# Patient Record
Sex: FEMALE | Race: WHITE | NOT HISPANIC OR LATINO | URBAN - METROPOLITAN AREA
[De-identification: names, ages, dates, MRNs, and addresses within clinical notes are randomized per-mention and may not be internally consistent; named-entity substitution may affect disease eponyms.]

---

## 2019-11-05 ENCOUNTER — INPATIENT (INPATIENT)
Facility: HOSPITAL | Age: 29
LOS: 0 days | Discharge: HOME | DRG: 638 | End: 2019-11-06
Attending: STUDENT IN AN ORGANIZED HEALTH CARE EDUCATION/TRAINING PROGRAM | Admitting: STUDENT IN AN ORGANIZED HEALTH CARE EDUCATION/TRAINING PROGRAM
Payer: MEDICAID

## 2019-11-05 VITALS
HEIGHT: 68 IN | RESPIRATION RATE: 18 BRPM | SYSTOLIC BLOOD PRESSURE: 118 MMHG | DIASTOLIC BLOOD PRESSURE: 75 MMHG | WEIGHT: 160.06 LBS | OXYGEN SATURATION: 98 % | HEART RATE: 103 BPM | TEMPERATURE: 98 F

## 2019-11-05 DIAGNOSIS — E87.2 ACIDOSIS: ICD-10-CM

## 2019-11-05 DIAGNOSIS — R05 COUGH: ICD-10-CM

## 2019-11-05 DIAGNOSIS — Z91.89 OTHER SPECIFIED PERSONAL RISK FACTORS, NOT ELSEWHERE CLASSIFIED: ICD-10-CM

## 2019-11-05 DIAGNOSIS — R63.8 OTHER SYMPTOMS AND SIGNS CONCERNING FOOD AND FLUID INTAKE: ICD-10-CM

## 2019-11-05 DIAGNOSIS — E10.9 TYPE 1 DIABETES MELLITUS WITHOUT COMPLICATIONS: ICD-10-CM

## 2019-11-05 DIAGNOSIS — R73.9 HYPERGLYCEMIA, UNSPECIFIED: ICD-10-CM

## 2019-11-05 LAB
ALBUMIN SERPL ELPH-MCNC: 4.8 G/DL — SIGNIFICANT CHANGE UP (ref 3.3–5)
ALP SERPL-CCNC: 92 U/L — SIGNIFICANT CHANGE UP (ref 40–120)
ALT FLD-CCNC: 15 U/L — SIGNIFICANT CHANGE UP (ref 10–45)
ANION GAP SERPL CALC-SCNC: 15 MMOL/L — SIGNIFICANT CHANGE UP (ref 5–17)
APPEARANCE UR: CLEAR — SIGNIFICANT CHANGE UP
AST SERPL-CCNC: 19 U/L — SIGNIFICANT CHANGE UP (ref 10–40)
B-OH-BUTYR SERPL-SCNC: 0.6 MMOL/L — HIGH
BASOPHILS # BLD AUTO: 0.03 K/UL — SIGNIFICANT CHANGE UP (ref 0–0.2)
BASOPHILS NFR BLD AUTO: 0.2 % — SIGNIFICANT CHANGE UP (ref 0–2)
BILIRUB SERPL-MCNC: 0.3 MG/DL — SIGNIFICANT CHANGE UP (ref 0.2–1.2)
BILIRUB UR-MCNC: NEGATIVE — SIGNIFICANT CHANGE UP
BUN SERPL-MCNC: 20 MG/DL — SIGNIFICANT CHANGE UP (ref 7–23)
CALCIUM SERPL-MCNC: 9.9 MG/DL — SIGNIFICANT CHANGE UP (ref 8.4–10.5)
CHLORIDE SERPL-SCNC: 103 MMOL/L — SIGNIFICANT CHANGE UP (ref 96–108)
CO2 SERPL-SCNC: 25 MMOL/L — SIGNIFICANT CHANGE UP (ref 22–31)
COLOR SPEC: YELLOW — SIGNIFICANT CHANGE UP
CREAT SERPL-MCNC: 0.68 MG/DL — SIGNIFICANT CHANGE UP (ref 0.5–1.3)
DIFF PNL FLD: NEGATIVE — SIGNIFICANT CHANGE UP
EOSINOPHIL # BLD AUTO: 0.15 K/UL — SIGNIFICANT CHANGE UP (ref 0–0.5)
EOSINOPHIL NFR BLD AUTO: 0.9 % — SIGNIFICANT CHANGE UP (ref 0–6)
GAS PNL BLDV: SIGNIFICANT CHANGE UP
GLUCOSE SERPL-MCNC: 292 MG/DL — HIGH (ref 70–99)
GLUCOSE UR QL: >=1000
HCT VFR BLD CALC: 44.6 % — SIGNIFICANT CHANGE UP (ref 34.5–45)
HGB BLD-MCNC: 14.7 G/DL — SIGNIFICANT CHANGE UP (ref 11.5–15.5)
IMM GRANULOCYTES NFR BLD AUTO: 0.6 % — SIGNIFICANT CHANGE UP (ref 0–1.5)
KETONES UR-MCNC: 40 MG/DL
LACTATE SERPL-SCNC: 2.5 MMOL/L — HIGH (ref 0.5–2)
LACTATE SERPL-SCNC: 3.1 MMOL/L — HIGH (ref 0.5–2)
LEUKOCYTE ESTERASE UR-ACNC: NEGATIVE — SIGNIFICANT CHANGE UP
LYMPHOCYTES # BLD AUTO: 19.1 % — SIGNIFICANT CHANGE UP (ref 13–44)
LYMPHOCYTES # BLD AUTO: 3.04 K/UL — SIGNIFICANT CHANGE UP (ref 1–3.3)
MCHC RBC-ENTMCNC: 31.4 PG — SIGNIFICANT CHANGE UP (ref 27–34)
MCHC RBC-ENTMCNC: 33 GM/DL — SIGNIFICANT CHANGE UP (ref 32–36)
MCV RBC AUTO: 95.3 FL — SIGNIFICANT CHANGE UP (ref 80–100)
MONOCYTES # BLD AUTO: 0.67 K/UL — SIGNIFICANT CHANGE UP (ref 0–0.9)
MONOCYTES NFR BLD AUTO: 4.2 % — SIGNIFICANT CHANGE UP (ref 2–14)
NEUTROPHILS # BLD AUTO: 11.91 K/UL — HIGH (ref 1.8–7.4)
NEUTROPHILS NFR BLD AUTO: 75 % — SIGNIFICANT CHANGE UP (ref 43–77)
NITRITE UR-MCNC: NEGATIVE — SIGNIFICANT CHANGE UP
NRBC # BLD: 0 /100 WBCS — SIGNIFICANT CHANGE UP (ref 0–0)
PH UR: 6 — SIGNIFICANT CHANGE UP (ref 5–8)
PLATELET # BLD AUTO: 277 K/UL — SIGNIFICANT CHANGE UP (ref 150–400)
POTASSIUM SERPL-MCNC: 4.1 MMOL/L — SIGNIFICANT CHANGE UP (ref 3.5–5.3)
POTASSIUM SERPL-SCNC: 4.1 MMOL/L — SIGNIFICANT CHANGE UP (ref 3.5–5.3)
PROT SERPL-MCNC: 7.3 G/DL — SIGNIFICANT CHANGE UP (ref 6–8.3)
PROT UR-MCNC: NEGATIVE MG/DL — SIGNIFICANT CHANGE UP
RBC # BLD: 4.68 M/UL — SIGNIFICANT CHANGE UP (ref 3.8–5.2)
RBC # FLD: 12.3 % — SIGNIFICANT CHANGE UP (ref 10.3–14.5)
SODIUM SERPL-SCNC: 143 MMOL/L — SIGNIFICANT CHANGE UP (ref 135–145)
SP GR SPEC: 1.02 — SIGNIFICANT CHANGE UP (ref 1–1.03)
UROBILINOGEN FLD QL: 0.2 E.U./DL — SIGNIFICANT CHANGE UP
WBC # BLD: 15.89 K/UL — HIGH (ref 3.8–10.5)
WBC # FLD AUTO: 15.89 K/UL — HIGH (ref 3.8–10.5)

## 2019-11-05 PROCEDURE — 99291 CRITICAL CARE FIRST HOUR: CPT

## 2019-11-05 PROCEDURE — 71046 X-RAY EXAM CHEST 2 VIEWS: CPT | Mod: 26

## 2019-11-05 PROCEDURE — 93010 ELECTROCARDIOGRAM REPORT: CPT

## 2019-11-05 PROCEDURE — 99222 1ST HOSP IP/OBS MODERATE 55: CPT | Mod: GC

## 2019-11-05 RX ORDER — ALBUTEROL 90 UG/1
2.5 AEROSOL, METERED ORAL ONCE
Refills: 0 | Status: COMPLETED | OUTPATIENT
Start: 2019-11-05 | End: 2019-11-05

## 2019-11-05 RX ORDER — INSULIN LISPRO 100/ML
VIAL (ML) SUBCUTANEOUS
Refills: 0 | Status: DISCONTINUED | OUTPATIENT
Start: 2019-11-05 | End: 2019-11-06

## 2019-11-05 RX ORDER — INSULIN LISPRO 100/ML
10 VIAL (ML) SUBCUTANEOUS
Refills: 0 | Status: DISCONTINUED | OUTPATIENT
Start: 2019-11-05 | End: 2019-11-06

## 2019-11-05 RX ORDER — SODIUM CHLORIDE 9 MG/ML
1000 INJECTION INTRAMUSCULAR; INTRAVENOUS; SUBCUTANEOUS ONCE
Refills: 0 | Status: COMPLETED | OUTPATIENT
Start: 2019-11-05 | End: 2019-11-05

## 2019-11-05 RX ORDER — DEXTROSE 50 % IN WATER 50 %
15 SYRINGE (ML) INTRAVENOUS ONCE
Refills: 0 | Status: DISCONTINUED | OUTPATIENT
Start: 2019-11-05 | End: 2019-11-06

## 2019-11-05 RX ORDER — INSULIN GLARGINE 100 [IU]/ML
30 INJECTION, SOLUTION SUBCUTANEOUS AT BEDTIME
Refills: 0 | Status: DISCONTINUED | OUTPATIENT
Start: 2019-11-06 | End: 2019-11-06

## 2019-11-05 RX ORDER — DEXTROSE 50 % IN WATER 50 %
25 SYRINGE (ML) INTRAVENOUS ONCE
Refills: 0 | Status: DISCONTINUED | OUTPATIENT
Start: 2019-11-05 | End: 2019-11-06

## 2019-11-05 RX ORDER — GLUCAGON INJECTION, SOLUTION 0.5 MG/.1ML
1 INJECTION, SOLUTION SUBCUTANEOUS ONCE
Refills: 0 | Status: DISCONTINUED | OUTPATIENT
Start: 2019-11-05 | End: 2019-11-06

## 2019-11-05 RX ORDER — IPRATROPIUM/ALBUTEROL SULFATE 18-103MCG
3 AEROSOL WITH ADAPTER (GRAM) INHALATION EVERY 6 HOURS
Refills: 0 | Status: DISCONTINUED | OUTPATIENT
Start: 2019-11-05 | End: 2019-11-06

## 2019-11-05 RX ORDER — IPRATROPIUM/ALBUTEROL SULFATE 18-103MCG
3 AEROSOL WITH ADAPTER (GRAM) INHALATION ONCE
Refills: 0 | Status: COMPLETED | OUTPATIENT
Start: 2019-11-05 | End: 2019-11-05

## 2019-11-05 RX ORDER — SODIUM CHLORIDE 9 MG/ML
1000 INJECTION, SOLUTION INTRAVENOUS
Refills: 0 | Status: DISCONTINUED | OUTPATIENT
Start: 2019-11-05 | End: 2019-11-06

## 2019-11-05 RX ORDER — SODIUM CHLORIDE 9 MG/ML
1000 INJECTION INTRAMUSCULAR; INTRAVENOUS; SUBCUTANEOUS
Refills: 0 | Status: DISCONTINUED | OUTPATIENT
Start: 2019-11-05 | End: 2019-11-06

## 2019-11-05 RX ORDER — DEXTROSE 50 % IN WATER 50 %
12.5 SYRINGE (ML) INTRAVENOUS ONCE
Refills: 0 | Status: DISCONTINUED | OUTPATIENT
Start: 2019-11-05 | End: 2019-11-06

## 2019-11-05 RX ADMIN — SODIUM CHLORIDE 100 MILLILITER(S): 9 INJECTION INTRAMUSCULAR; INTRAVENOUS; SUBCUTANEOUS at 23:00

## 2019-11-05 RX ADMIN — SODIUM CHLORIDE 1000 MILLILITER(S): 9 INJECTION INTRAMUSCULAR; INTRAVENOUS; SUBCUTANEOUS at 21:47

## 2019-11-05 RX ADMIN — ALBUTEROL 2.5 MILLIGRAM(S): 90 AEROSOL, METERED ORAL at 21:47

## 2019-11-05 RX ADMIN — SODIUM CHLORIDE 1000 MILLILITER(S): 9 INJECTION INTRAMUSCULAR; INTRAVENOUS; SUBCUTANEOUS at 21:46

## 2019-11-05 RX ADMIN — Medication 3 MILLILITER(S): at 21:47

## 2019-11-05 NOTE — H&P ADULT - PROBLEM SELECTOR PLAN 5
Diet: Diabetic Diet  Replete lytes prn  NS @100cc/hr    VTE ppx: IMPROVE score 0. Low Risk  Disposition: [facility]  Code Status: Full Code

## 2019-11-05 NOTE — H&P ADULT - NSHPPHYSICALEXAM_GEN_ALL_CORE
VITAL SIGNS:  T(C): 36.6 (11-05-19 @ 20:09), Max: 36.6 (11-05-19 @ 20:09)  T(F): 97.9 (11-05-19 @ 20:09), Max: 97.9 (11-05-19 @ 20:09)  HR: 95 (11-05-19 @ 22:01) (95 - 103)  BP: 110/60 (11-05-19 @ 22:01) (110/60 - 118/75)  BP(mean): --  RR: 16 (11-05-19 @ 22:01) (16 - 18)  SpO2: 99% (11-05-19 @ 22:01) (98% - 99%)  Wt(kg): --    PHYSICAL EXAM:    Constitutional: WDWN resting comfortably in bed; NAD  Head: NC/AT  Eyes: PERRL, EOMI, anicteric sclera  ENT: no nasal discharge; uvula midline, no oropharyngeal erythema or exudates; MMM  Neck: supple; no JVD or thyromegaly  Respiratory: CTA B/L; no W/R/R, no retractions  Cardiac: +S1/S2; RRR; no M/R/G; PMI non-displaced  Gastrointestinal: abdomen soft, NT/ND; no rebound or guarding; +BSx4  Back: spine midline, no bony tenderness or step-offs; no CVAT B/L  Extremities: WWP, no clubbing or cyanosis; no peripheral edema  Musculoskeletal: NROM x4; no joint swelling, tenderness or erythema  Vascular: 2+ radial, femoral, DP/PT pulses B/L  Dermatologic: skin warm, dry and intact; no rashes, wounds, or scars  Lymphatic: no submandibular or cervical LAD  Neurologic: AAOx3; CNII-XII grossly intact; no focal deficits  Psychiatric: affect and characteristics of appearance, verbalizations, behaviors are appropriate VITAL SIGNS:  T(C): 36.6 (11-05-19 @ 20:09), Max: 36.6 (11-05-19 @ 20:09)  T(F): 97.9 (11-05-19 @ 20:09), Max: 97.9 (11-05-19 @ 20:09)  HR: 95 (11-05-19 @ 22:01) (95 - 103)  BP: 110/60 (11-05-19 @ 22:01) (110/60 - 118/75)  BP(mean): --  RR: 16 (11-05-19 @ 22:01) (16 - 18)  SpO2: 99% (11-05-19 @ 22:01) (98% - 99%)  Wt(kg): --    PHYSICAL EXAM:    Constitutional: WDWN resting comfortably in bed; NAD  Head: NC/AT  Eyes: PERRL, EOMI, anicteric sclera  ENT: no nasal discharge; uvula midline, no oropharyngeal erythema or exudates; MMM  Neck: supple; no JVD or thyromegaly  Respiratory: CTA B/L; expiratory wheezing anterior and posteriorly, no crackles  Cardiac: +S1/S2; RRR; no M/R/G; PMI non-displaced  Gastrointestinal: abdomen soft, NT/ND; no rebound or guarding; +BSx4  Back: spine midline, no bony tenderness or step-offs; no CVAT B/L  Extremities: WWP, no clubbing or cyanosis; no peripheral edema  Musculoskeletal: NROM x4; no joint swelling, tenderness or erythema  Vascular: 2+ radial, femoral, DP/PT pulses B/L  Dermatologic: skin warm, dry and intact; no rashes, wounds, or scars  Lymphatic: no submandibular or cervical LAD  Neurologic: AAOx3; no focal deficits  Psychiatric: affect and characteristics of appearance, verbalizations, behaviors are appropriate

## 2019-11-05 NOTE — H&P ADULT - PROBLEM SELECTOR PLAN 1
-Presenting to [facility] ER for hyperglycemia with POCT  mg/dl. VBG with no acidosis, BHB 0.6, AG wnl. No mental status changes. No concern for DKA/HHS at this time.   -Etiology of her hyperglycemia in the setting of her non compliance with her insulin due to fear of hypoglycemia which is described in detail in the HPI  -Will continue her home regimen for now: Lantus 30 units at bedtime and Lispro 10 units TID given she endorses when her she takes her medications as prescribed her blood glucose is usually between 100-200 with occasional lows.   -Endocrine consult in the am for her poorly controlled Type 1 DM (HbA1c 11.4 around 1 week ago as per patient). Patient needs to establish follow up as she recently moved from [facility] for coverage  -Monitor FS  -Diabetic Diet -Presenting to [facility] ER for hyperglycemia with POCT  mg/dl. VBG with no acidosis, BHB 0.6, AG wnl. No mental status changes. No concern for DKA/HHS at this time.   -Etiology of her hyperglycemia in the setting of her non compliance with her pre meal insulin due to fear of hypoglycemia which is described in detail in the HPI  -Will continue her home regimen for now: Lantus 30 units at bedtime and Lispro 10 units TID given she endorses when her she takes her medications as prescribed her blood glucose is usually between 100-200 with occasional lows.   -Endocrine consult in the am for her poorly controlled Type 1 DM (HbA1c 11.4 around 1 week ago as per patient). Patient needs to establish follow up as she recently moved from [facility] for coverage  -Monitor FS  -Diabetic Diet -Presenting to [facility] ER for hyperglycemia with POCT  mg/dl. VBG with no acidosis, BHB 0.6, AG wnl. No mental status changes. No concern for DKA/HHS at this time.   -Etiology of her hyperglycemia in the setting of her non compliance with her pre meal insulin due to fear of hypoglycemia which is described in detail in the HPI  -Will continue her home lantus 30 units at bedtime and decrease lispro to 8 unit TID given she describes very frequent episodes of hypoglycemia   -Endocrine consult in the am for her poorly controlled Type 1 DM (HbA1c 11.4 around 1 week ago as per patient). Patient needs to establish follow up as she recently moved from [facility] for coverage  -Monitor FS  -Diabetic Diet

## 2019-11-05 NOTE — H&P ADULT - PROBLEM SELECTOR PLAN 3
-Likely in the setting of intravascular depletion from hyperglycemia. S/p 2LNS bolus.  -Follow up repeat lactate. Patient is hemodynamically stable, perfusing adequately. Her extremities are WWP, mentating AAOX3, urinating

## 2019-11-05 NOTE — ED ADULT NURSE NOTE - NSIMPLEMENTINTERV_GEN_ALL_ED
Implemented All Universal Safety Interventions:  [place] to call system. Call bell, personal items and telephone within reach. Instruct patient to call for assistance. Room bathroom lighting operational. Non-slip footwear when patient is off stretcher. Physically safe environment: no spills, clutter or unnecessary equipment. Stretcher in lowest position, wheels locked, appropriate side rails in place.

## 2019-11-05 NOTE — H&P ADULT - HISTORY OF PRESENT ILLNESS
Patient is a 30 yo F with history of poorly controlled Type 1 DM with neuropathy who presents with the following complaints. She states that today she was feeling very sweaty, fatigued associated with dry mouth, heavy sensation all over her body (described as mud going through her veins) and polyuria. When she checked her FS, it was HIGH (over 600) so she was afraid she was in DKA and decided to come to the ER for evaluation. She took her Lantus 30 units today at 7 pm and took correctional humalog 25 units at 7 pm prior to coming to the ER. Today, she did not use any of her pre meal insulin injections for breakfast and lunch. She also endorses 1-2 day history of intermittent productive cough and wheezing and endorses her uncle was recently staying with her and he had a viral URI. She denies any chest pain, difficulty breathing, nausea, vomiting, abdominal pain, loose bowel movements, dysuria or headache. Denies any fevers, chills sore throat, runny nose. All remaining ROS negative except as mentioned otherwise.   With regards to her diabetes history, she was diagnosed with Type 1 DM 5-6 years ago. She is currently taking Lantus 30 units at bedtime and Lispro 10 units TID. She endorses compliance with her Lantus however states she is non compliant with her pre meal insulin mostly out of fear of hypoglycemia. Today, she had breakfast and lunch however did not take any of her pre meal insulin. She states previously she would be hypoglycemic almost every day as low as 19 and because of that fear she now tends to miss at least 5-6 of her pre meal injections per week. She checks her fasting FS 3-4 times per week and it ranges between  depending on her dinner, usually ranges between 100-200 when she eats dinner. She checks her post prandial 4-5 times per weak and it usually high above 250. With regards to complications, she has neuropathy involving b/l hands and feet. She has had 6 episodes of DKA in the past, most recently in July 2019. Her diet varies including meals such as pasta and pizza and salads sometimes. She was previously following with a PCP in [place] Dr. Johnson who managed her diabetes however since she moved to [place] in October, she does not have a PCP or endocrinologist. Her most recent HbA1c was around 1 week ago and it was 11.4%. Patient is a 30 yo F with history of poorly controlled Type 1 DM with neuropathy who presents with the following complaints. She states that today she was feeling very sweaty, fatigued associated with dry mouth, heavy sensation all over her body (described as mud going through her veins) and polyuria. When she checked her FS, it was HIGH (over 600) so she was afraid she was in DKA and decided to come to the ER for evaluation. She took her Lantus 30 units today at 7 pm and took correctional humalog 25 units at 7 pm prior to coming to the ER. Today, she did not use any of her pre meal insulin injections for breakfast and lunch. She also endorses 1-2 day history of intermittent productive cough and wheezing and endorses her uncle was recently staying with her and he had a viral URI. She denies any chest pain, difficulty breathing, nausea, vomiting, abdominal pain, loose bowel movements, dysuria or headache. Denies any fevers, chills sore throat, runny nose. All remaining ROS negative except as mentioned otherwise.   With regards to her diabetes history, she was diagnosed with Type 1 DM 5-6 years ago. She is currently taking Lantus 30 units at bedtime and Lispro 10 units TID. She endorses compliance with her Lantus however states she is non compliant with her pre meal insulin mostly out of fear of hypoglycemia. Today, she had breakfast and lunch however did not take any of her pre meal insulin. She states previously she would be hypoglycemic almost every day as low as 19 and because of that fear she now tends to miss at least 5-6 of her pre meal injections per week. She checks her fasting FS 3-4 times per week and it ranges between  depending on her dinner, usually ranges between 100-200 when she eats dinner. She checks her post prandial 4-5 times per weak and it usually high above 250. With regards to complications, she has neuropathy involving b/l hands and feet. She has had 6 episodes of DKA in the past, most recently in July 2019. Her diet varies including meals such as pasta and pizza and salads sometimes. She was previously following with a PCP in [place] Dr. Johnson who managed her diabetes however since she moved to [place] in October, she does not have a PCP or endocrinologist. Her most recent HbA1c was around 1 week ago and it was 11.4%.   On arrival to [facility] ED: VS: afebrile, , bp 118/75 and no respiratory distress. Initial labs s/f WBC of 12.59K, lactate 3.1, BMP with no AG, POCT , BHB 0.6, VBG with no acidosis, UA with ketones and >1000 glucose, CXR with no infiltrate. ED course: 2LNS bolus. Patient is being admitted to regional medical floors for further management. Patient is a 28 yo F with history of poorly controlled Type 1 DM with neuropathy who presents with the following complaints. She states that today she was feeling very sweaty, fatigued associated with dry mouth, heavy sensation all over her body (described as mud going through her veins) and polyuria. When she checked her FS, it was HIGH (over 600) so she was afraid she was in DKA and decided to come to the ER for evaluation. She took her Lantus 30 units today at 7 pm and took correctional humalog 25 units at 7 pm prior to coming to the ER. Today, she did not use any of her pre meal insulin injections for breakfast and lunch. She also endorses 1-2 day history of intermittent productive cough and wheezing and endorses her uncle was recently staying with her and he had a viral URI. She denies any chest pain, difficulty breathing, nausea, vomiting, abdominal pain, loose bowel movements, dysuria or headache. Denies any fevers, chills, sore throat, runny nose. All remaining ROS negative except as mentioned otherwise.   With regards to her diabetes history, she was diagnosed with Type 1 DM 5-6 years ago. She is currently taking Lantus 30 units at bedtime and Lispro 10 units TID. She endorses compliance with her Lantus however states she is non compliant with her pre meal insulin mostly out of fear of hypoglycemia. Today, she had breakfast and lunch however did not take any of her pre meal insulin. She states previously she would be hypoglycemic almost every day as low as 19 and because of that fear she now tends to miss at least 5-6 of her pre meal injections per week. She checks her fasting FS 3-4 times per week and it ranges between  depending on her dinner, usually ranges between 100-200 when she eats dinner. She checks her post prandial 4-5 times per weak and it usually high above 250. With regards to complications, she has neuropathy involving b/l hands and feet. She has had 6 episodes of DKA in the past, most recently in July 2019. Her diet varies including meals such as pasta and pizza and salads sometimes. She was previously following with a PCP in [place] Dr. Johnson who managed her diabetes however since she moved to [place] in October, she does not have a PCP or endocrinologist. Her most recent HbA1c was around 1 week ago and it was 11.4%.   On arrival to [facility] ED: VS: afebrile, , bp 118/75 and no respiratory distress. Initial labs s/f WBC of 12.59K, lactate 3.1, BMP with no AG, POCT , BHB 0.6, VBG with no acidosis, UA with ketones and >1000 glucose, CXR with no infiltrate. ED course: 2LNS bolus. Patient is being admitted to regional medical floors for further management.

## 2019-11-05 NOTE — ED PROVIDER NOTE - OBJECTIVE STATEMENT
29 year old female with history of Type I DM (on Lantus, Novalog) presents to ED with concern for elevated blood sugar today.  Patient notes she checked her blood sugar prior to arrival and saw that it was over 600 - at which point she took Novalog and came to ED.  She notes history of DKA, stating she feels as though she may be in DKA at this time.  She admits to recent onset of productive cough with associated wheezing.  Hx of tobacco use noted.  She denies associated fever, chills, chest pain, shortness of breath, abdominal pain, nausea, emesis, changes to bowel movements, urinary symptoms, peripheral edema, rashes or any additional acute complaints or concerns at this time.

## 2019-11-05 NOTE — ED ADULT TRIAGE NOTE - ARRIVAL INFO ADDITIONAL COMMENTS
pt states she was feeling thirsty with a headache and checked her BS and it was over 600.  took 25 units of humalog with her lantus at 7pm.  pt c/o cough and chest congestion.  denies change in insulin dosing.

## 2019-11-05 NOTE — ED PROVIDER NOTE - CLINICAL SUMMARY MEDICAL DECISION MAKING FREE TEXT BOX
Patient in ED w concern for elevated blood sugar today.  Noted to be type I diabetic, with history of DKA x multiple episodes in the past.  Blood sugar noted to be elevated on ED arrival.  IVF bolus x 2 L ordered and labs sent.  Lactate noted to be elevated.  CXR without evidence of PNA.  Will await remaining labs and consult with MICU team secondary to concern for DKA.  Following completion of my shift, patient's care is handed over to oncoming night team pending MICU recommendations.  Anticipate tele admission with close monitoring.  Patient is stable at time of transfer of care. Patient in ED w concern for elevated blood sugar today.  Noted to be type I diabetic, with history of DKA x multiple episodes in the past.  Blood sugar noted to be elevated on ED arrival.  IVF bolus x 2 L ordered and labs sent.  Lactate noted to be elevated.  CXR without evidence of PNA. Patient in ED w concern for elevated blood sugar today.  Noted to be type I diabetic, with history of DKA x multiple episodes in the past.  Blood sugar noted to be elevated on ED arrival.  IVF bolus x 2 L ordered and labs sent.  Lactate noted to be elevated.  CXR without evidence of PNA.  Beta hydroxy slightly elevated, no AG.  + 40 ketones in urine.  Will plan to give IVF, repeat blood sugar and admit to hospitalist team, given no indication for insulin gtt at this time, though concern for high risk for DKA given history.  Plan is discussed with patient who is in agreement.  Will admit at this time.

## 2019-11-05 NOTE — H&P ADULT - NSHPLABSRESULTS_GEN_ALL_CORE
LABS:                         14.7   15.89 )-----------( 277      ( 2019 20:44 )             44.6         143  |  103  |  20  ----------------------------<  292<H>  4.1   |  25  |  0.68    Ca    9.9      2019 20:44  Mg     2.1         TPro  7.3  /  Alb  4.8  /  TBili  0.3  /  DBili  x   /  AST  19  /  ALT  15  /  AlkPhos  92        Urinalysis Basic - ( 2019 20:50 )    Color: Yellow / Appearance: Clear / S.020 / pH: x  Gluc: x / Ketone: 40 mg/dL  / Bili: Negative / Urobili: 0.2 E.U./dL   Blood: x / Protein: NEGATIVE mg/dL / Nitrite: NEGATIVE   Leuk Esterase: NEGATIVE / RBC: x / WBC x   Sq Epi: x / Non Sq Epi: x / Bacteria: x        Lactate, Blood: 3.1 mmoL/L ( @ 21:03)    RADIOLOGY, EKG & ADDITIONAL TESTS: Reviewed.

## 2019-11-05 NOTE — H&P ADULT - ASSESSMENT
28 yo F with history of poorly controlled Type 1 Diabetes Mellitus (6 episodes of DKA, HbA1c 11.4%) who presents with hyperglycemia likely in the setting of non compliance with her pre meal insulin due to fear of hypoglycemia, admitted to regional medical floors for further management.

## 2019-11-05 NOTE — H&P ADULT - PROBLEM SELECTOR PLAN 4
-Presenting with 1 day history of intermittently productive cough associated with wheezing. Physical exam s/f expiratory wheezing anterior and posterior.   -Etiology suspected to be viral in nature given recent sick contact  -Low concern for bacterial PNA currently. Her CXR shows no infiltrates  -Follow up RVP  -Duonebs prn for wheezing -Presenting with 1 day history of intermittently productive cough associated with wheezing. Physical exam s/f expiratory wheezing anterior and posterior lung  -Etiology suspected to be viral in nature given recent sick contact  -Low concern for bacterial PNA currently. Her CXR shows no infiltrates  -Follow up RVP  -Duonebs prn for wheezing -Presenting with 1 day history of intermittently productive cough associated with wheezing. Physical exam s/f expiratory wheezing anterior and posterior lung  -Etiology suspected to be viral in nature given recent sick contact  -Low concern for bacterial PNA currently. Her CXR shows no infiltrates  -RVP +Enterovirus  -Duonebs prn for wheezing

## 2019-11-06 ENCOUNTER — TRANSCRIPTION ENCOUNTER (OUTPATIENT)
Age: 29
End: 2019-11-06

## 2019-11-06 VITALS
RESPIRATION RATE: 18 BRPM | HEART RATE: 86 BPM | SYSTOLIC BLOOD PRESSURE: 118 MMHG | TEMPERATURE: 98 F | OXYGEN SATURATION: 97 % | DIASTOLIC BLOOD PRESSURE: 74 MMHG

## 2019-11-06 LAB
ALBUMIN SERPL ELPH-MCNC: 3.5 G/DL — SIGNIFICANT CHANGE UP (ref 3.3–5)
ALP SERPL-CCNC: 67 U/L — SIGNIFICANT CHANGE UP (ref 40–120)
ALT FLD-CCNC: 11 U/L — SIGNIFICANT CHANGE UP (ref 10–45)
ANION GAP SERPL CALC-SCNC: 10 MMOL/L — SIGNIFICANT CHANGE UP (ref 5–17)
AST SERPL-CCNC: 14 U/L — SIGNIFICANT CHANGE UP (ref 10–40)
BASOPHILS # BLD AUTO: 0.03 K/UL — SIGNIFICANT CHANGE UP (ref 0–0.2)
BASOPHILS NFR BLD AUTO: 0.3 % — SIGNIFICANT CHANGE UP (ref 0–2)
BILIRUB SERPL-MCNC: 0.3 MG/DL — SIGNIFICANT CHANGE UP (ref 0.2–1.2)
BUN SERPL-MCNC: 17 MG/DL — SIGNIFICANT CHANGE UP (ref 7–23)
CALCIUM SERPL-MCNC: 8.1 MG/DL — LOW (ref 8.4–10.5)
CHLORIDE SERPL-SCNC: 112 MMOL/L — HIGH (ref 96–108)
CO2 SERPL-SCNC: 22 MMOL/L — SIGNIFICANT CHANGE UP (ref 22–31)
CREAT SERPL-MCNC: 0.58 MG/DL — SIGNIFICANT CHANGE UP (ref 0.5–1.3)
EOSINOPHIL # BLD AUTO: 0.33 K/UL — SIGNIFICANT CHANGE UP (ref 0–0.5)
EOSINOPHIL NFR BLD AUTO: 3.2 % — SIGNIFICANT CHANGE UP (ref 0–6)
GLUCOSE BLDC GLUCOMTR-MCNC: 136 MG/DL — HIGH (ref 70–99)
GLUCOSE BLDC GLUCOMTR-MCNC: 228 MG/DL — HIGH (ref 70–99)
GLUCOSE SERPL-MCNC: 159 MG/DL — HIGH (ref 70–99)
HBA1C BLD-MCNC: 10.3 % — HIGH (ref 4–5.6)
HCT VFR BLD CALC: 39.6 % — SIGNIFICANT CHANGE UP (ref 34.5–45)
HGB BLD-MCNC: 12.5 G/DL — SIGNIFICANT CHANGE UP (ref 11.5–15.5)
HIV 1+2 AB+HIV1 P24 AG SERPL QL IA: SIGNIFICANT CHANGE UP
IMM GRANULOCYTES NFR BLD AUTO: 0.2 % — SIGNIFICANT CHANGE UP (ref 0–1.5)
LACTATE SERPL-SCNC: 1.5 MMOL/L — SIGNIFICANT CHANGE UP (ref 0.5–2)
LYMPHOCYTES # BLD AUTO: 3.82 K/UL — HIGH (ref 1–3.3)
LYMPHOCYTES # BLD AUTO: 37.5 % — SIGNIFICANT CHANGE UP (ref 13–44)
MAGNESIUM SERPL-MCNC: 1.9 MG/DL — SIGNIFICANT CHANGE UP (ref 1.6–2.6)
MCHC RBC-ENTMCNC: 30.7 PG — SIGNIFICANT CHANGE UP (ref 27–34)
MCHC RBC-ENTMCNC: 31.6 GM/DL — LOW (ref 32–36)
MCV RBC AUTO: 97.3 FL — SIGNIFICANT CHANGE UP (ref 80–100)
MONOCYTES # BLD AUTO: 0.74 K/UL — SIGNIFICANT CHANGE UP (ref 0–0.9)
MONOCYTES NFR BLD AUTO: 7.3 % — SIGNIFICANT CHANGE UP (ref 2–14)
NEUTROPHILS # BLD AUTO: 5.25 K/UL — SIGNIFICANT CHANGE UP (ref 1.8–7.4)
NEUTROPHILS NFR BLD AUTO: 51.5 % — SIGNIFICANT CHANGE UP (ref 43–77)
NRBC # BLD: 0 /100 WBCS — SIGNIFICANT CHANGE UP (ref 0–0)
PLATELET # BLD AUTO: 235 K/UL — SIGNIFICANT CHANGE UP (ref 150–400)
POTASSIUM SERPL-MCNC: 4 MMOL/L — SIGNIFICANT CHANGE UP (ref 3.5–5.3)
POTASSIUM SERPL-SCNC: 4 MMOL/L — SIGNIFICANT CHANGE UP (ref 3.5–5.3)
PROT SERPL-MCNC: 5.5 G/DL — LOW (ref 6–8.3)
RAPID RVP RESULT: DETECTED
RBC # BLD: 4.07 M/UL — SIGNIFICANT CHANGE UP (ref 3.8–5.2)
RBC # FLD: 12.3 % — SIGNIFICANT CHANGE UP (ref 10.3–14.5)
RV+EV RNA SPEC QL NAA+PROBE: DETECTED
SODIUM SERPL-SCNC: 144 MMOL/L — SIGNIFICANT CHANGE UP (ref 135–145)
WBC # BLD: 10.19 K/UL — SIGNIFICANT CHANGE UP (ref 3.8–10.5)
WBC # FLD AUTO: 10.19 K/UL — SIGNIFICANT CHANGE UP (ref 3.8–10.5)

## 2019-11-06 PROCEDURE — 99239 HOSP IP/OBS DSCHRG MGMT >30: CPT | Mod: GC

## 2019-11-06 PROCEDURE — 82962 GLUCOSE BLOOD TEST: CPT

## 2019-11-06 PROCEDURE — 87184 SC STD DISK METHOD PER PLATE: CPT

## 2019-11-06 PROCEDURE — 82330 ASSAY OF CALCIUM: CPT

## 2019-11-06 PROCEDURE — 81003 URINALYSIS AUTO W/O SCOPE: CPT

## 2019-11-06 PROCEDURE — 84295 ASSAY OF SERUM SODIUM: CPT

## 2019-11-06 PROCEDURE — 82010 KETONE BODYS QUAN: CPT

## 2019-11-06 PROCEDURE — 87633 RESP VIRUS 12-25 TARGETS: CPT

## 2019-11-06 PROCEDURE — 93005 ELECTROCARDIOGRAM TRACING: CPT

## 2019-11-06 PROCEDURE — 99222 1ST HOSP IP/OBS MODERATE 55: CPT | Mod: GC

## 2019-11-06 PROCEDURE — 71046 X-RAY EXAM CHEST 2 VIEWS: CPT

## 2019-11-06 PROCEDURE — 99285 EMERGENCY DEPT VISIT HI MDM: CPT | Mod: 25

## 2019-11-06 PROCEDURE — 87798 DETECT AGENT NOS DNA AMP: CPT

## 2019-11-06 PROCEDURE — 36415 COLL VENOUS BLD VENIPUNCTURE: CPT

## 2019-11-06 PROCEDURE — 94640 AIRWAY INHALATION TREATMENT: CPT

## 2019-11-06 PROCEDURE — 83735 ASSAY OF MAGNESIUM: CPT

## 2019-11-06 PROCEDURE — 87086 URINE CULTURE/COLONY COUNT: CPT

## 2019-11-06 PROCEDURE — 82803 BLOOD GASES ANY COMBINATION: CPT

## 2019-11-06 PROCEDURE — 87581 M.PNEUMON DNA AMP PROBE: CPT

## 2019-11-06 PROCEDURE — 80053 COMPREHEN METABOLIC PANEL: CPT

## 2019-11-06 PROCEDURE — 87389 HIV-1 AG W/HIV-1&-2 AB AG IA: CPT

## 2019-11-06 PROCEDURE — 83605 ASSAY OF LACTIC ACID: CPT

## 2019-11-06 PROCEDURE — 87040 BLOOD CULTURE FOR BACTERIA: CPT

## 2019-11-06 PROCEDURE — 87486 CHLMYD PNEUM DNA AMP PROBE: CPT

## 2019-11-06 PROCEDURE — 85025 COMPLETE CBC W/AUTO DIFF WBC: CPT

## 2019-11-06 PROCEDURE — 83036 HEMOGLOBIN GLYCOSYLATED A1C: CPT

## 2019-11-06 PROCEDURE — 84132 ASSAY OF SERUM POTASSIUM: CPT

## 2019-11-06 RX ORDER — INSULIN GLARGINE 100 [IU]/ML
30 INJECTION, SOLUTION SUBCUTANEOUS AT BEDTIME
Refills: 0 | Status: DISCONTINUED | OUTPATIENT
Start: 2019-11-06 | End: 2019-11-06

## 2019-11-06 RX ORDER — INSULIN LISPRO 100/ML
10 VIAL (ML) SUBCUTANEOUS
Qty: 0 | Refills: 0 | DISCHARGE

## 2019-11-06 RX ORDER — INSULIN LISPRO 100/ML
5 VIAL (ML) SUBCUTANEOUS
Qty: 0 | Refills: 0 | DISCHARGE

## 2019-11-06 RX ORDER — INSULIN LISPRO 100/ML
8 VIAL (ML) SUBCUTANEOUS
Refills: 0 | Status: DISCONTINUED | OUTPATIENT
Start: 2019-11-06 | End: 2019-11-06

## 2019-11-06 RX ORDER — INSULIN GLARGINE 100 [IU]/ML
25 INJECTION, SOLUTION SUBCUTANEOUS
Qty: 0 | Refills: 0 | DISCHARGE

## 2019-11-06 RX ORDER — INSULIN GLARGINE 100 [IU]/ML
30 INJECTION, SOLUTION SUBCUTANEOUS
Qty: 0 | Refills: 0 | DISCHARGE

## 2019-11-06 RX ADMIN — Medication 8 UNIT(S): at 07:01

## 2019-11-06 RX ADMIN — SODIUM CHLORIDE 100 MILLILITER(S): 9 INJECTION INTRAMUSCULAR; INTRAVENOUS; SUBCUTANEOUS at 07:01

## 2019-11-06 RX ADMIN — Medication 2: at 12:28

## 2019-11-06 RX ADMIN — Medication 8 UNIT(S): at 12:29

## 2019-11-06 NOTE — DISCHARGE NOTE PROVIDER - NSDCFUADDAPPT_GEN_ALL_CORE_FT
Please followup with the endocrinologist and primary care provider as scheduled Please follow-up with endocrinology at the following appointments at [address], [address] as discussed  -11/18 9am with Kalpana Toribio RN  -11/18 11am with Merissa Lacy LCSW

## 2019-11-06 NOTE — DISCHARGE NOTE PROVIDER - NSDCCPCAREPLAN_GEN_ALL_CORE_FT
PRINCIPAL DISCHARGE DIAGNOSIS  Diagnosis: Hyperglycemia  Assessment and Plan of Treatment: You were noted to have abnormally high blood glucose levels. This was diagnosed based on your clinical presentation as well as your labwork. This was likely caused by your underlying diagnosis of Type I Diabetes and improper medical management with insulin. You were treated with insulin and IV hydration, and demonstrated improvement of glucose levels. You were evaluated by the endocrinology service, who recommended changes to your current medication regimen. Please follow this new regimen as discussed, and please follow-up with the endocrinology service in the outpatient setting as scheduled.      SECONDARY DISCHARGE DIAGNOSES  Diagnosis: Type 1 diabetes  Assessment and Plan of Treatment: You were previously diagnosed with Type I diabetes. You were evaluated by the endocrinology service, who recommended changes in your insulin regimen. Please continue to take your home insulin as discussed. Please follow with the endocrinology service in the outpatient setting as scheduled. Please follow-up with [facility] as your primary care provider as scheduled for continued evaluation and management of your condition.

## 2019-11-06 NOTE — DISCHARGE NOTE PROVIDER - CARE PROVIDER_API CALL
SID[place],   [facility]  Phone: (   )    -  Fax: (   )    -  Follow Up Time: Kalpana Toribio  [address], [address] • [place] [address]  Phone: [phone]  Fax: (   )    -  Scheduled Appointment: 11/18/2019 12:00 AM    Merissa Deluca  [address], [address] • [place] [address]  Phone: [phone]  Fax: (   )    -  Scheduled Appointment: 11/18/2019 12:00 AM

## 2019-11-06 NOTE — CONSULT NOTE ADULT - ATTENDING COMMENTS
Pt seen on rounds this afternoon.  Had come to the ER because of marked hyperglycemia a few hours earlier (though had already decreased to the 300 range on arrival) and a fear that she was in DKA because she was not feeling well.  Glycemic control has been poor as an outpatient, mostly because of omitted pre-meal Humalog doses (plus excessive precautionary bedtime snacking) out of an inordinate fear of hypoglycemia.  She never skips her Lantus dose, which is the reason she probably manages to stay out of DKA, and her beta-OH-butyrate level was normal on presentation now.  Most of her hypoglycemia apparently occurs in the middle of the night or at wake-up, and her Lantus dose has been steadily tapered from an initial 60 units.    She clearly needs increased fingerstick monitoring and/or a CGM--though the latter would be preferable because of its alarm function for hypoglycemia.  This can hopefully be worked out in the future.  --Given her occasional AM hypoglycemia on the current Lantus dose, to decrease to 25 units.  --her carb intake is reasonably consistent and not excessive.  Have suggested that she begin taking only 5 units of Humalog before meals for now, which she agreed to.  Also suggested that she limit her precautionary snack at bedtime to fingersticks under 250.  --Finally, emphasized the need for much more frequent FS testing, and to bring the log of her readings to her post-discharge visit at [address] [place] [address]

## 2019-11-06 NOTE — PROGRESS NOTE ADULT - PROBLEM SELECTOR PLAN 3
Likely in the setting of intravascular depletion from hyperglycemia. S/p 2LNS bolus.  Patient is hemodynamically stable, perfusing adequately: extremities WWP, mentating AAOX3, urinating, VSS  Resolved. Lactate 1.5

## 2019-11-06 NOTE — DISCHARGE NOTE PROVIDER - NSDCMRMEDTOKEN_GEN_ALL_CORE_FT
HumaLOG 100 units/mL subcutaneous solution: 10 unit(s) subcutaneous 3 times a day (before meals)  Lantus 100 units/mL subcutaneous solution: 30 unit(s) subcutaneous once a day (at bedtime) HumaLOG 100 units/mL subcutaneous solution: 5 unit(s) subcutaneous 3 times a day (before meals)  Lantus 100 units/mL subcutaneous solution: 25 unit(s) subcutaneous once a day (at bedtime)

## 2019-11-06 NOTE — PROGRESS NOTE ADULT - PROBLEM SELECTOR PLAN 4
1 day history of intermittently productive cough associated with wheezing.  Likely viral etiology as below; supportive care  -RVP (+)enterovirus   -CXR shows no infiltrate  -Duonebs prn for wheezing

## 2019-11-06 NOTE — PATIENT PROFILE ADULT - NSPROHMDIABETHBA1C_GEN_A_NUR
Discussed with Dr Bear Zuleta pt is to be set up for trigger point injection. Pt called message left for pt to call back to schedule trigger point injection. known

## 2019-11-06 NOTE — DISCHARGE NOTE PROVIDER - HOSPITAL COURSE
Patient is a 30 y/o female with a history of Type I Diabetes presenting after experiencing symptomatic hyperglycemia with home  in setting of medication noncompliance and lack of primary care provider/endocrinologist in outpatient setting.        #Hyperglycemia: Patient with home  in setting of medication noncompliance. Patient then took 30 lantus and 25 lispro at home. In ED . Based on labwork and clinical presentation, low concern for DKA. Patient received 8 units of lispro and IV fluids with improvement of hyperglycemia. Hgb A1c 10.5. Patient evaluated by endocrinology service with recommendations for continuation of ___Lantus qhs and ____Lispro premeal in outpatient setting. Patient to follow with endocrinology in outpatient setting.        #Lactic acid elevation: Lactate 3.1 in setting of intravascular depletion 2/2 hyperglycemia. Lactate was trended to normal limits. Patient remained hemodynamically stable throughout hospital course.        #Enterovirus URI: patient presenting with cough. RVP(+)enterovirus. Possible component of trigger of hyperglyemic episode. Supportive care.        Patient counseled on medical condition and management; demonstrates understanding of care provided and expressing desire for continued follow-up. Patient medically optimized and deemed stable for discharge. Patient to follow-up with endocrinology in outpatient setting as above. Patient to establish care at [facility] given lack of primary care provider. Patient is a 30 y/o female with a history of Type I Diabetes presenting after experiencing symptomatic hyperglycemia with home  in setting of medication noncompliance and lack of primary care provider/endocrinologist in outpatient setting.        #Hyperglycemia: Patient with home  in setting of medication noncompliance. Patient then took 30 lantus and 25 lispro at home. In ED . Based on labwork and clinical presentation, low concern for DKA. Patient received 8 units of lispro and IV fluids with improvement of hyperglycemia. Hgb A1c 10.5. Patient evaluated by endocrinology service with recommendations for continuation of ___Lantus qhs and ____Lispro premeal in outpatient setting. Patient to follow with endocrinology in outpatient setting.        #Lactic acid elevation: Lactate 3.1 in setting of intravascular depletion 2/2 hyperglycemia. Lactate was trended to normal limits. Patient remained hemodynamically stable throughout hospital course.        #Enterovirus URI: patient presenting with cough. RVP(+)enterovirus. Possible component of trigger of hyperglyemic episode. Supportive care.        Patient counseled on medical condition and management; demonstrates understanding of care provided and expressing desire for continued follow-up. Patient medically optimized and deemed stable for discharge. Patient to follow-up with endocrinology and social work in outpatient setting as above. Patient is a 28 y/o female with a history of Type I Diabetes presenting after experiencing symptomatic hyperglycemia with home  in setting of medication noncompliance and lack of primary care provider/endocrinologist in outpatient setting.        #Hyperglycemia: Patient with home  in setting of medication noncompliance. Patient then took 30 lantus and 25 lispro at home. In ED . Based on labwork and clinical presentation, low concern for DKA. Patient received 8 units of lispro and IV fluids with improvement of hyperglycemia. Hgb A1c 10.5. Patient evaluated by endocrinology service with recommendations for continuation of 30 Lantus qhs and 10 Lispro premeal in outpatient setting. Patient to follow with endocrinology in outpatient setting on 11/18.         #Lactic acid elevation: Lactate 3.1 in setting of intravascular depletion 2/2 hyperglycemia. Lactate was trended to normal limits. Patient remained hemodynamically stable throughout hospital course.        #Enterovirus URI: patient presenting with cough. RVP(+)enterovirus. Possible component of trigger of hyperglyemic episode. Supportive care.        Patient counseled on medical condition and management; demonstrates understanding of care provided and expressing desire for continued follow-up. Patient medically optimized and deemed stable for discharge. Patient to follow-up with endocrinology and social work in outpatient setting as above. Patient is a 30 y/o female with a history of Type I Diabetes presenting after experiencing symptomatic hyperglycemia with home  in setting of medication noncompliance and lack of primary care provider/endocrinologist in outpatient setting.        #Hyperglycemia: Patient with history of Type I Diabetes on regimen of 30 lantus/10 lispro. Patient with home  in setting of medication noncompliance. Patient then took 30 lantus and 25 lispro at home. In ED . Based on labwork and clinical presentation, low concern for DKA. Patient received 8 units of lispro and IV fluids with improvement of hyperglycemia. Hgb A1c 10.5. Patient evaluated by endocrinology service with recommendations for continuation of 25 Lantus qhs and 5 Lispro premeal in outpatient setting. Patient to follow with endocrinology in outpatient setting on 11/18.         #Lactic acid elevation: Lactate 3.1 in setting of intravascular depletion 2/2 hyperglycemia. Lactate was trended to normal limits. Patient remained hemodynamically stable throughout hospital course.        #Enterovirus URI: patient presenting with cough. RVP(+)enterovirus. Possible component of trigger of hyperglyemic episode. Supportive care.        Patient counseled on medical condition and management; demonstrates understanding of care provided and expressing desire for continued follow-up. Patient medically optimized and deemed stable for discharge. Patient to follow-up with endocrinology and social work in outpatient setting as above.

## 2019-11-06 NOTE — PROGRESS NOTE ADULT - ASSESSMENT
30 yo F with history of poorly controlled Type 1 Diabetes Mellitus (6 episodes of DKA, HbA1c 11.4%) who presents with hyperglycemia likely in the setting of non-compliance with her pre-meal insulin, admitted to regional medical floors for further management

## 2019-11-06 NOTE — DISCHARGE NOTE PROVIDER - PROVIDER TOKENS
FREE:[LAST:[[facility]],PHONE:[(   )    -],FAX:[(   )    -],ADDRESS:[[facility]]] FREE:[LAST:[Kirstinres],FIRST:[Kalpana],PHONE:[phone]],FAX:[(   )    -],ADDRESS:[[address] [place] [address] • [place] [address]],SCHEDULEDAPPT:[11/18/2019],SCHEDULEDAPPTTIME:[12:00 AM]],FREE:[LAST:[Marrow],FIRST:[Merissa],PHONE:[phone]:[(   )    -],ADDRESS:[[address] [place] [address]],SCHEDULEDAPPT:[11/18/2019],SCHEDULEDAPPTTIME:[12:00 AM]]

## 2019-11-06 NOTE — DISCHARGE NOTE NURSING/CASE MANAGEMENT/SOCIAL WORK - PATIENT PORTAL LINK FT
You can access the FollowMyHealth Patient Portal offered by [facility] by registering at the following website: [email or web address]. By joining [facility]’s FollowMyHealth portal, you will also be able to view your health information using other applications (apps) compatible with our system.

## 2019-11-06 NOTE — DISCHARGE NOTE NURSING/CASE MANAGEMENT/SOCIAL WORK - NSDCFUADDAPPT_GEN_ALL_CORE_FT
Please follow-up with endocrinology at the following appointments at [address], [address] as discussed  -11/18 9am with Kalpana Toribio RN  -11/18 11am with Merissa Lacy LCSW

## 2019-11-06 NOTE — CONSULT NOTE ADULT - SUBJECTIVE AND OBJECTIVE BOX
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 She states that today she was feeling very sweaty, fatigued associated with dry mouth, heavy sensation all over her body (described as mud going through her veins) and polyuria. When she checked her FS, it was HIGH (over 600) so she was afraid she was in DKA and decided to come to the ER for evaluation. She took her Lantus 30 units today at 7 pm and took correctional humalog 25 units at 7 pm prior to coming to the ER. Today, she did not use any of her pre meal insulin injections for breakfast and lunch. She also endorses 1-2 day history of intermittent productive cough and wheezing and endorses her uncle was recently staying with her and he had a viral URI. She denies any chest pain, difficulty breathing, nausea, vomiting, abdominal pain, loose bowel movements, dysuria or headache. Denies any fevers, chills, sore throat, runny nose. All remaining ROS negative except as mentioned otherwise.   With regards to her diabetes history, she was diagnosed with Type 1 DM 5-6 years ago. She is currently taking Lantus 30 units at bedtime and Lispro 10 units TID. She endorses compliance with her Lantus however states she is non compliant with her pre meal insulin mostly out of fear of hypoglycemia. Today, she had breakfast and lunch however did not take any of her pre meal insulin. She states previously she would be hypoglycemic almost every day as low as 19 and because of that fear she now tends to miss at least 5-6 of her pre meal injections per week. She checks her fasting FS 3-4 times per week and it ranges between  depending on her dinner, usually ranges between 100-200 when she eats dinner. She checks her post prandial 4-5 times per weak and it usually high above 250. With regards to complications, she has neuropathy involving b/l hands and feet. She has had 6 episodes of DKA in the past, most recently in 2019. Her diet varies including meals such as pasta and pizza and salads sometimes. She was previously following with a PCP in [place] Dr. Johnson who managed her diabetes however since she moved to [place] in October, she does not have a PCP or endocrinologist. Her most recent HbA1c was around 1 week ago and it was 11.4%.   On arrival to [facility] ED: VS: afebrile, , bp 118/75 and no respiratory distress. Initial labs s/f WBC of 12.59K, lactate 3.1, BMP with no AG, POCT , BHB 0.6, VBG with no acidosis, UA with ketones and >1000 glucose, CXR with no infiltrate. ED course: 2LNS bolus. Patient is being admitted to regional medical floors for further management.       FSG & insulin:    Dinner FSG   Lispro   +    Ate  Bedtime FSG     Lantus      and Lispro         Breakfast FSG   Lispro    +    Ate  Lunch FSG      Lispro    +    Ate      Age at Dx:  How dx:  Hx and duration of insulin:  Current Therapy:  Hx of other regimens:    Home FSG:  Fasting  Lunch  Dinner  Bed    Diet:  Exercise:    Hx of hypoglycemia:  Hx of DKA/HHS:  Complications:  Outpatient Endo:    FH:  DM:  Thyroid:  Autoimmune:  Other:    SH:  Recently moved from [place]  	Smokes 1/2-1PPD for 15 years  Denies any drug abuse or alcohol abuse  Smoking  Etoh:  Recreational Drugs:  Social Life:    PMH & Surgical Hx:HYPERGLYCEMIA; LACTIC ACIDOSIS  No pertinent family history in first degree relatives  Handoff  MEWS Score  DM (diabetes mellitus), type 1  Hyperglycemia  Transition of care performed with sharing of clinical summary  Nutrition, metabolism, and development symptoms  Cough  Lactic acidosis  DM (diabetes mellitus), type 1  Hyperglycemia  No significant past surgical history  MED EVAL  Lactic acidosis          Current Meds:  albuterol/ipratropium for Nebulization 3 milliLiter(s) Nebulizer every 6 hours PRN  dextrose 40% Gel 15 Gram(s) Oral once PRN  dextrose 5%. 1000 milliLiter(s) IV Continuous <Continuous>  dextrose 50% Injectable 12.5 Gram(s) IV Push once  dextrose 50% Injectable 25 Gram(s) IV Push once  dextrose 50% Injectable 25 Gram(s) IV Push once  glucagon  Injectable 1 milliGRAM(s) IntraMuscular once PRN  insulin glargine Injectable (LANTUS) 30 Unit(s) SubCutaneous at bedtime  insulin lispro (HumaLOG) corrective regimen sliding scale   SubCutaneous Before meals and at bedtime  insulin lispro Injectable (HumaLOG) 8 Unit(s) SubCutaneous three times a day before meals  sodium chloride 0.9%. 1000 milliLiter(s) IV Continuous <Continuous>      Allergies:  No Known Allergies      ROS:  Denies the following except as indicated.    General: weight loss/weight gain, decreased appetite, fatigue  Eyes: Blurry vision, double vision, visual changes  ENT: Throat pain, changes in voice,   CV: palpitations, SOB, CP, cough  GI: NVD, difficulty swallowing, abdominal pain  : polyuria, dysuria  Endo: abnormal menses, temperature intolerance, decreased libido  MSK: weakness, joint pain  Skin: rash, dryness, diaphoresis  Heme: Easy bruising, bleeding  Neuro: HA, dizziness, lightheadedness, numbness/ tingling  Psych: Anxiety, Depression    Vital Signs Last 24 Hrs  T(C): 36.7 (2019 05:50), Max: 36.8 (2019 22:01)  T(F): 98.1 (2019 05:50), Max: 98.3 (2019 02:00)  HR: 81 (2019 05:50) (81 - 103)  BP: 99/55 (2019 05:50) (99/55 - 118/75)  BP(mean): --  RR: 18 (2019 05:50) (16 - 18)  SpO2: 97% (2019 05:50) (97% - 99%)  Height (cm): 172.72 ( @ 20:09)  Weight (kg): 72.6 ( @ 20:09)  BMI (kg/m2): 24.3 ( @ 20:09)      Constitutional: wn/wd in NAD.   HEENT: NCAT, MMM, OP clear, EOMI, , no proptosis or lid retraction  Neck: no thyromegaly or palpable thyroid nodules   Respiratory: lungs CTAB.  Cardiovascular: regular rhythm, normal S1 and S2, no audible murmurs, no peripheral edema  GI: soft, NT/ND, no masses/HSM appreciated.  Neurology: no tremors, DTR 2+  Skin: no visible rashes/lesions. no acanthosis nigricans. no hyperlipotrophy. no cushing's stigmata.  Psychiatric: AAO x 3, normal affect/mood.  Ext: radial pulses intact, DP pulses intact, extremities warm, no cyanosis, clubbing or edema.       LABS:                        12.5   10.19 )-----------( 235      ( 2019 06:34 )             39.6         144  |  112<H>  |  17  ----------------------------<  159<H>  4.0   |  22  |  0.58    Ca    8.1<L>      2019 06:34  Mg     1.9         TPro  5.5<L>  /  Alb  3.5  /  TBili  0.3  /  DBili  x   /  AST  14  /  ALT  11  /  AlkPhos  67        Urinalysis Basic - ( 2019 20:50 )    Color: Yellow / Appearance: Clear / S.020 / pH: x  Gluc: x / Ketone: 40 mg/dL  / Bili: Negative / Urobili: 0.2 E.U./dL   Blood: x / Protein: NEGATIVE mg/dL / Nitrite: NEGATIVE   Leuk Esterase: NEGATIVE / RBC: x / WBC x   Sq Epi: x / Non Sq Epi: x / Bacteria: x      Hemoglobin A1C, Whole Blood: 10.3 ( @ 06:34)        RADIOLOGY & ADDITIONAL STUDIES:  CAPILLARY BLOOD GLUCOSE      POCT Blood Glucose.: 136 mg/dL (2019 06:55)  POCT Blood Glucose.: 325 mg/dL (2019 20:13)      Will discuss in rounds  A/P: 30 yo F with history of poorly controlled Type 1 Diabetes Mellitus (6 episodes of DKA, HbA1c 11.4%) who presents with hyperglycemia likely in the setting of non compliance with her pre meal insulin due to fear of hypoglycemia, admitted to regional medical floors for further management.    1.  DM -   A1C:  Weight:  Cr/GRF:  ER:    Please continue lantus       units at night / morning.  Please continue lispro      units before each meal.  Please continue lispro moderate / low dose sliding scale four times daily with meals and at bedtime    Pt's fingerstick glucose goal is     Will continue to monitor     For discharge, pt can continue    Pt can follow up at discharge with [facility] by calling  to make an appointment.   Will discuss case with     and update primary team    To Make an appointment at [address] [place] [address] for the patient, either:  1. Send a STAT task via [employer] to Mony Wagner or Simin Tavarez (office managers)   OR  2. Call supervisor's line. P: [phone] (do not give this number to patient). VM is checked periodically.  In the message, specify that this is a hospital discharge follow-up, and that the appt can be made with a NP if there is no timely MD availability.    REMINDERS FOR INSULIN/DIABETES SUPPLIES at DISCHARGE:  INSULIN:   Long actin/Basal Insulin: Examples: Toujeo, Basaglar, Tresiba, Lantus   Short acting/Bolus Insulin: Humalog, Admelog, Novolog  Please ensure that BOTH short acting and long acting insulin are prescribed in the same preparation (Ex: PEN vs VIAL/SOLUTION)     TESTING SUPPLIES:   All glucometer supplies should be written as generic to avoid issues with insurance. Use the free text option in sunrise prescription writer, and type in glucometer test strips, lancets, etc to order.    If sending patient home on insulin PEN, please send:   •	BD oliverio insulin pen needles for use up to 4 times daily (total quantity 100)  •	Lancets for use up to 4 times daily (total quantity 100)  •	Glucometer Test strips for use up to 4 times daily (total quantity 100)  •	Alcohol swabs for use up to 4 times daily (total quantity 100)  •	Glucometer (If provided by hospital, still provide scripts for lancets, test strips, and swabs)  If sending patient home on insulin VIAL, please send:   •	Insulin syringes (6mm) - for use up to 4 times daily (total quantity 100)  •	Lancets for use up to 4 times daily (total quantity 100)  •	Generic Glucometer Test strips for use up to 4 times daily (total quantity 100)  •	Alcohol swabs for use up to 4 times daily (total quantity 100)  •	Generic Glucometer (If provided by hospital, still provide scripts for lancets, test strips, and swabs)  •	Do not specify brand for testing supplies (such as contour, freestyle, one touch etc) that way the pharmacy has the freedom to pick and change according to what the insurance dictates.  For patients without insurance:   •	Provide social work with appropriate scripts so they may obtain 1 week of samples  •	Provide with glucometer. Glucometers are located at various nursing stations, the nursing office, education, and endocrine fellows office.  •	Please make appointment with Tiarra Gamez NP or Kalpana Toribio RN and JOE Padilla at the [address] [place] [address] endocrinology clinic. They can see patients without insurance, provide appropriate samples, and assist in getting insurance coverage.     PREFERRED PHARMACY:  [facility] (located on 1st floor next to admitting)  P: [phone]  Hours: M – F 8AM – 8PM, Sat 8AM – 4PM, Sun—closed  If not using [employer], please follow up with chosen pharmacy to ensure insulin prescribed is covered. 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 When she checked her FS, it was HIGH (over 600) and was concerned she could be in DKA so presented to ER for evaluation. An hour before her arrival to ED she took Lantus 30 units and correctional humalog 25 units.  She had not taken any pre meal insulin injections for breakfast and lunch. She also endorses 1-2 day history of intermittent productive cough and wheezing and viral panel was positive for entero/rhinovirus. She denies any chest pain, difficulty breathing, nausea, vomiting, abdominal pain, loose bowel movements, dysuria or headache. Denies any fevers, chills, sore throat, runny nose. Denies any changes in weight. All remaining ROS negative except as mentioned otherwise. A1C 10.3%. A week ago 11.4% per pt. 73kg, BMI 24.    On arrival to [facility] ED: VS: afebrile, , bp 118/75 and no respiratory distress. Initial labs s/f WBC of 12.59K, lactate 3.1, BMP with no AG, POCT , BHB 0.6, VBG with no acidosis, UA with ketones and >1000 glucose, CXR with no infiltrate. ED course: 2LNS bolus. Patient is being admitted to regional medical floors for further management.     FSG & insulin:  :  7PM Lantus 30 and humalog 25.  8PM . 2L NS and nebs.  :  Breakfast . Humalog 8. Ate Croatian toast, eggs, and fruit.  Lunch . Humalog 8+2.    Age at Dx: approx 24 y/o  Hx and duration of insulin: Lantus 30 units and occasional humalog 10-20 (BG>500) units premeal. She only takes humalog when she checks her BG, which is approx 10x/week.      Home FSG: checks approx 10x/week. She only checks when she doesn't "feel right."  Fastin-250  Pre Dinner: 100-200  Post prandial: >250    Diet: B: egg sandwich, L: pizza and hamburgers, D: cooks, switched to cauliflower rice and sometimes pasta. No sodas or juice. Like ice cream.  Exercise:    Hx of hypoglycemia: many in the past. Has made her fearful, thus limiting humalog dose.  Hx of DKA/HHS: 6 episodes most recently in July.  Complications: neuropathy feet and hands. eye exam UTD-no retinopathy no nephropathy. denies any symptoms of gastroparesis.  Outpatient Endo:  previously following with a PCP in [place] Dr. Johnson. She has been told she is not a candidate for insulin pump b/c BG is too labile. Before she moved, they were in the process of obtaining CGM, not sure which one.    FH:  DM: denies  Thyroid: denies  Autoimmune: denies    SH:  Recently moved from [place]. [place] before that. Won't be eligible for [facility] medicaid for 2 months.  Denies any drug abuse or alcohol abuse  Smoking: Smokes 1/2-1PPD for 15 years  Social Life: moved to [place] for [employer]. Has friend in the city.    PMH & Surgical Hx:HYPERGLYCEMIA; LACTIC ACIDOSIS  No pertinent family history in first degree relatives  Handoff  MEWS Score  DM (diabetes mellitus), type 1  Hyperglycemia  Transition of care performed with sharing of clinical summary  Nutrition, metabolism, and development symptoms  Cough  Lactic acidosis  DM (diabetes mellitus), type 1  Hyperglycemia  No significant past surgical history  MED EVAL  Lactic acidosis          Current Meds:  albuterol/ipratropium for Nebulization 3 milliLiter(s) Nebulizer every 6 hours PRN  dextrose 40% Gel 15 Gram(s) Oral once PRN  dextrose 5%. 1000 milliLiter(s) IV Continuous <Continuous>  dextrose 50% Injectable 12.5 Gram(s) IV Push once  dextrose 50% Injectable 25 Gram(s) IV Push once  dextrose 50% Injectable 25 Gram(s) IV Push once  glucagon  Injectable 1 milliGRAM(s) IntraMuscular once PRN  insulin glargine Injectable (LANTUS) 30 Unit(s) SubCutaneous at bedtime  insulin lispro (HumaLOG) corrective regimen sliding scale   SubCutaneous Before meals and at bedtime  insulin lispro Injectable (HumaLOG) 8 Unit(s) SubCutaneous three times a day before meals  sodium chloride 0.9%. 1000 milliLiter(s) IV Continuous <Continuous>      Allergies:  No Known Allergies      ROS:  Denies the following except as indicated.    General: weight loss/weight gain, decreased appetite, fatigue  Eyes: Blurry vision, double vision, visual changes  ENT: Throat pain, changes in voice,   CV: palpitations, SOB, CP  GI: NVD, difficulty swallowing, abdominal pain  : polyuria, dysuria  Endo: abnormal menses, temperature intolerance, decreased libido  MSK: weakness, joint pain  Skin: rash, dryness, diaphoresis  Heme: Easy bruising, bleeding  Neuro: HA, dizziness, lightheadedness, numbness/ tingling  Psych: Anxiety, Depression    Vital Signs Last 24 Hrs  T(C): 36.7 (2019 05:50), Max: 36.8 (2019 22:01)  T(F): 98.1 (2019 05:50), Max: 98.3 (2019 02:00)  HR: 81 (2019 05:50) (81 - 103)  BP: 99/55 (2019 05:50) (99/55 - 118/75)  BP(mean): --  RR: 18 (2019 05:50) (16 - 18)  SpO2: 97% (2019 05:50) (97% - 99%)  Height (cm): 172.72 ( @ 20:09)  Weight (kg): 72.6 ( @ 20:09)  BMI (kg/m2): 24.3 ( @ 20:09)      Constitutional: wn/wd in NAD.   HEENT: NCAT, MMM, OP clear, EOMI, , no proptosis or lid retraction  Neck: no thyromegaly or palpable thyroid nodules   Respiratory: wheezing throughout  Cardiovascular: regular rhythm, normal S1 and S2, no audible murmurs, no peripheral edema  GI: soft, NT/ND, no masses/HSM appreciated.  Neurology: no tremors, DTR 2+  Skin: no visible rashes/lesions. no acanthosis nigricans. no hyperlipotrophy. no cushing's stigmata.  Psychiatric: AAO x 3, normal affect/mood.  Ext: radial pulses intact, DP pulses intact, extremities warm, no cyanosis, clubbing or edema.       LABS:                        12.5   10.19 )-----------( 235      ( 2019 06:34 )             39.6     11-    144  |  112<H>  |  17  ----------------------------<  159<H>  4.0   |  22  |  0.58    Ca    8.1<L>      2019 06:34  Mg     1.9         TPro  5.5<L>  /  Alb  3.5  /  TBili  0.3  /  DBili  x   /  AST  14  /  ALT  11  /  AlkPhos  67        Urinalysis Basic - ( 2019 20:50 )    Color: Yellow / Appearance: Clear / S.020 / pH: x  Gluc: x / Ketone: 40 mg/dL  / Bili: Negative / Urobili: 0.2 E.U./dL   Blood: x / Protein: NEGATIVE mg/dL / Nitrite: NEGATIVE   Leuk Esterase: NEGATIVE / RBC: x / WBC x   Sq Epi: x / Non Sq Epi: x / Bacteria: x      Hemoglobin A1C, Whole Blood: 10.3 ( @ 06:34)    RADIOLOGY & ADDITIONAL STUDIES:  CAPILLARY BLOOD GLUCOSE  POCT Blood Glucose.: 136 mg/dL (2019 06:55)  POCT Blood Glucose.: 325 mg/dL (2019 20:13)      Will discuss in rounds  A/P: 28 yo F with history of poorly controlled Type 1 Diabetes Mellitus (6 episodes of DKA, HbA1c 11.4%) who presents with hyperglycemia likely in the setting of non compliance with her pre meal insulin due to fear of hypoglycemia.    1.  DM - type 1, uncontrolled, complicated  A1C: 10.3%  Weight: 73kg, BMI 24  Cr/GRF: 0.58/123      Please continue lantus 30  units at night.  Please continue lispro  10    units before each meal.  Please continue lispro moderate dose sliding scale four times daily with meals and at bedtime    Pt's fingerstick glucose goal is 100-180.    Will continue to monitor     For discharge, pt can continue Lantus 30 units at bedtime and lispro 10 units before meals. She is eligible for a free visit at [address] [place] [address] pending insurance authorization. She has the following appointments:  :  aKlpana Toribio (RN educator) at 9AM  Merissa YIP) at 11AM    Pt can follow up at discharge with [facility] by calling  to make an appointment.   Will discuss case with Dr. WINN   and update primary team    To Make an appointment at [address] [place] [address] for the patient, either:  1. Send a STAT task via [employer] to Mony Wagner or Simin Tavarez (office managers)   OR  2. Call supervisor's line. P: [phone] (do not give this number to patient). VM is checked periodically.  In the message, specify that this is a hospital discharge follow-up, and that the appt can be made with a NP if there is no timely MD availability.    REMINDERS FOR INSULIN/DIABETES SUPPLIES at DISCHARGE:  INSULIN:   Long actin/Basal Insulin: Examples: Toujeo, Basaglar, Tresiba, Lantus   Short acting/Bolus Insulin: Humalog, Admelog, Novolog  Please ensure that BOTH short acting and long acting insulin are prescribed in the same preparation (Ex: PEN vs VIAL/SOLUTION)     TESTING SUPPLIES:   All glucometer supplies should be written as generic to avoid issues with insurance. Use the free text option in sunrise prescription writer, and type in glucometer test strips, lancets, etc to order.    If sending patient home on insulin PEN, please send:   •	BD oliverio insulin pen needles for use up to 4 times daily (total quantity 100)  •	Lancets for use up to 4 times daily (total quantity 100)  •	Glucometer Test strips for use up to 4 times daily (total quantity 100)  •	Alcohol swabs for use up to 4 times daily (total quantity 100)  •	Glucometer (If provided by hospital, still provide scripts for lancets, test strips, and swabs)  If sending patient home on insulin VIAL, please send:   •	Insulin syringes (6mm) - for use up to 4 times daily (total quantity 100)  •	Lancets for use up to 4 times daily (total quantity 100)  •	Generic Glucometer Test strips for use up to 4 times daily (total quantity 100)  •	Alcohol swabs for use up to 4 times daily (total quantity 100)  •	Generic Glucometer (If provided by hospital, still provide scripts for lancets, test strips, and swabs)  •	Do not specify brand for testing supplies (such as contour, freestyle, one touch etc) that way the pharmacy has the freedom to pick and change according to what the insurance dictates.  For patients without insurance:   •	Provide social work with appropriate scripts so they may obtain 1 week of samples  •	Provide with glucometer. Glucometers are located at various nursing stations, the nursing office, education, and endocrine fellows office.  •	Please make appointment with Tiarra Gamez NP or Kalpana Toribio RN and JOE Padilla at the [address] [place] [address] endocrinology clinic. They can see patients without insurance, provide appropriate samples, and assist in getting insurance coverage.     PREFERRED PHARMACY:  [facility] (located on 1st floor next to admitting)  P: [phone]  Hours: M – F 8AM – 8PM, Sat 8AM – 4PM, Sun—closed  If not using [employer], please follow up with chosen pharmacy to ensure insulin prescribed is covered. 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 When she checked her FS, it was HIGH (over 600) and was concerned she could be in DKA so presented to ER for evaluation. An hour before her arrival to ED she took Lantus 30 units and correctional humalog 25 units.  She had not taken any pre meal insulin injections for breakfast and lunch. She also endorses 1-2 day history of intermittent productive cough and wheezing and viral panel was positive for entero/rhinovirus. She denies any chest pain, difficulty breathing, nausea, vomiting, abdominal pain, loose bowel movements, dysuria or headache. Denies any fevers, chills, sore throat, runny nose. Denies any changes in weight. All remaining ROS negative except as mentioned otherwise. A1C 10.3%. A week ago 11.4% per pt. 73kg, BMI 24.    On arrival to [facility] ED: VS: afebrile, , bp 118/75 and no respiratory distress. Initial labs s/f WBC of 12.59K, lactate 3.1, BMP with no AG, POCT , BHB 0.6, VBG with no acidosis, UA with ketones and >1000 glucose, CXR with no infiltrate. ED course: 2LNS bolus. Patient is being admitted to regional medical floors for further management.     FSG & insulin:  :  7PM Lantus 30 and humalog 25.  8PM . 2L NS and nebs.  :  Breakfast . Humalog 8. Ate Welsh toast, eggs, and fruit.  Lunch . Humalog 8+2.    Age at Dx: approx 24 y/o  Hx and duration of insulin: Lantus 30 units and occasional humalog 10-20 (BG>500) units premeal. She only takes humalog when she checks her BG, which is approx 10x/week. Initially on lantus 60 units but has gradually been decreased for hypoglycemia.       Home FSG: checks approx 10x/week. She only checks when she doesn't "feel right."  Fastin-250  Pre Dinner: 100-200  Post prandial: >250    Diet: B: egg sandwich, L: pizza and hamburgers, D: cooks, switched to cauliflower rice and sometimes pasta. No sodas or juice. Like ice cream.  Exercise:    Hx of hypoglycemia: many in the past. Has made her fearful, thus limiting humalog dose. mostly overnight and in the AM.  Hx of DKA/HHS: 6 episodes most recently in July.  Complications: neuropathy feet and hands. eye exam UTD-no retinopathy no nephropathy. denies any symptoms of gastroparesis.  Outpatient Endo:  previously following with a PCP in [place] Dr. Johnson. She has been told she is not a candidate for insulin pump b/c BG is too labile. Before she moved, they were in the process of obtaining CGM, not sure which one.    FH:  DM: denies  Thyroid: denies  Autoimmune: denies    SH:  Recently moved from [place]. [place] before that. Won't be eligible for [facility] medicaid for 2 months.  Denies any drug abuse or alcohol abuse  Smoking: Smokes 1/2-1PPD for 15 years  Social Life: moved to [place] for [employer]. Has friend in the city.    PMH & Surgical Hx:HYPERGLYCEMIA; LACTIC ACIDOSIS  No pertinent family history in first degree relatives  Handoff  MEWS Score  DM (diabetes mellitus), type 1  Hyperglycemia  Transition of care performed with sharing of clinical summary  Nutrition, metabolism, and development symptoms  Cough  Lactic acidosis  DM (diabetes mellitus), type 1  Hyperglycemia  No significant past surgical history  MED EVAL  Lactic acidosis          Current Meds:  albuterol/ipratropium for Nebulization 3 milliLiter(s) Nebulizer every 6 hours PRN  dextrose 40% Gel 15 Gram(s) Oral once PRN  dextrose 5%. 1000 milliLiter(s) IV Continuous <Continuous>  dextrose 50% Injectable 12.5 Gram(s) IV Push once  dextrose 50% Injectable 25 Gram(s) IV Push once  dextrose 50% Injectable 25 Gram(s) IV Push once  glucagon  Injectable 1 milliGRAM(s) IntraMuscular once PRN  insulin glargine Injectable (LANTUS) 30 Unit(s) SubCutaneous at bedtime  insulin lispro (HumaLOG) corrective regimen sliding scale   SubCutaneous Before meals and at bedtime  insulin lispro Injectable (HumaLOG) 8 Unit(s) SubCutaneous three times a day before meals  sodium chloride 0.9%. 1000 milliLiter(s) IV Continuous <Continuous>      Allergies:  No Known Allergies      ROS:  Denies the following except as indicated.    General: weight loss/weight gain, decreased appetite, fatigue  Eyes: Blurry vision, double vision, visual changes  ENT: Throat pain, changes in voice,   CV: palpitations, SOB, CP  GI: NVD, difficulty swallowing, abdominal pain  : polyuria, dysuria  Endo: abnormal menses, temperature intolerance, decreased libido  MSK: weakness, joint pain  Skin: rash, dryness, diaphoresis  Heme: Easy bruising, bleeding  Neuro: HA, dizziness, lightheadedness, numbness/ tingling  Psych: Anxiety, Depression    Vital Signs Last 24 Hrs  T(C): 36.7 (2019 05:50), Max: 36.8 (2019 22:01)  T(F): 98.1 (2019 05:50), Max: 98.3 (2019 02:00)  HR: 81 (2019 05:50) (81 - 103)  BP: 99/55 (2019 05:50) (99/55 - 118/75)  BP(mean): --  RR: 18 (2019 05:50) (16 - 18)  SpO2: 97% (2019 05:50) (97% - 99%)  Height (cm): 172.72 ( @ 20:09)  Weight (kg): 72.6 ( @ 20:09)  BMI (kg/m2): 24.3 ( @ 20:09)      Constitutional: wn/wd in NAD.   HEENT: NCAT, MMM, OP clear, EOMI, , no proptosis or lid retraction  Neck: no thyromegaly or palpable thyroid nodules   Respiratory: wheezes throughout  Cardiovascular: regular rhythm, normal S1 and S2, no audible murmurs, no peripheral edema  GI: soft, NT/ND, no masses/HSM appreciated.  Neurology: no tremors, DTR 2+  Skin: no visible rashes/lesions. no acanthosis nigricans. no hyperlipotrophy. no cushing's stigmata.  Psychiatric: AAO x 3, normal affect/mood.  Ext: radial pulses intact, DP pulses intact, extremities warm, no cyanosis, clubbing or edema.       LABS:                        12.5   10.19 )-----------( 235      ( 2019 06:34 )             39.6         144  |  112<H>  |  17  ----------------------------<  159<H>  4.0   |  22  |  0.58    Ca    8.1<L>      2019 06:34  Mg     1.9         TPro  5.5<L>  /  Alb  3.5  /  TBili  0.3  /  DBili  x   /  AST  14  /  ALT  11  /  AlkPhos  67        Urinalysis Basic - ( 2019 20:50 )    Color: Yellow / Appearance: Clear / S.020 / pH: x  Gluc: x / Ketone: 40 mg/dL  / Bili: Negative / Urobili: 0.2 E.U./dL   Blood: x / Protein: NEGATIVE mg/dL / Nitrite: NEGATIVE   Leuk Esterase: NEGATIVE / RBC: x / WBC x   Sq Epi: x / Non Sq Epi: x / Bacteria: x      Hemoglobin A1C, Whole Blood: 10.3 ( @ 06:34)    RADIOLOGY & ADDITIONAL STUDIES:  CAPILLARY BLOOD GLUCOSE  POCT Blood Glucose.: 136 mg/dL (2019 06:55)  POCT Blood Glucose.: 325 mg/dL (2019 20:13)      A/P: 28 yo F with history of poorly controlled Type 1 Diabetes Mellitus (6 episodes of DKA, HbA1c 11.4%) who presents with hyperglycemia likely in the setting of non compliance with her pre meal insulin due to fear of hypoglycemia.    1.  DM - type 1, uncontrolled, complicated  A1C: 10.3%  Weight: 73kg, BMI 24  Cr/GRF: 0.58/123      Please decrease lantus to 25 units at night.  Please decrease lispro to 5 units before each meal.  Please continue lispro moderate dose sliding scale four times daily with meals and at bedtime    Pt's fingerstick glucose goal is 100-180.    Will continue to monitor     For discharge, pt can continue Lantus 25 units at bedtime and lispro 5 units before meals. She is eligible for a free visit at [address] [place] [address] pending insurance authorization. She has the following appointments:   18:  Kalpana Toribio (RN educator) at 9AM  Merissa YIP) at 11AM    Pt can follow up at discharge with [facility] by calling  to make an appointment.   Will discuss case with Dr. WINN   and update primary team    To Make an appointment at [address] [place] [address] for the patient, either:  1. Send a STAT task via [employer] to Mony Wagner or Simin Tavarez (office managers)   OR  2. Call supervisor's line. P: [phone] (do not give this number to patient). VM is checked periodically.  In the message, specify that this is a hospital discharge follow-up, and that the appt can be made with a NP if there is no timely MD availability.    REMINDERS FOR INSULIN/DIABETES SUPPLIES at DISCHARGE:  INSULIN:   Long actin/Basal Insulin: Examples: Toujeo, Basaglar, Tresiba, Lantus   Short acting/Bolus Insulin: Humalog, Admelog, Novolog  Please ensure that BOTH short acting and long acting insulin are prescribed in the same preparation (Ex: PEN vs VIAL/SOLUTION)     TESTING SUPPLIES:   All glucometer supplies should be written as generic to avoid issues with insurance. Use the free text option in [employer] prescription writer, and type in glucometer test strips, lancets, etc to order.    If sending patient home on insulin PEN, please send:   •	BD oliverio insulin pen needles for use up to 4 times daily (total quantity 100)  •	Lancets for use up to 4 times daily (total quantity 100)  •	Glucometer Test strips for use up to 4 times daily (total quantity 100)  •	Alcohol swabs for use up to 4 times daily (total quantity 100)  •	Glucometer (If provided by hospital, still provide scripts for lancets, test strips, and swabs)  If sending patient home on insulin VIAL, please send:   •	Insulin syringes (6mm) - for use up to 4 times daily (total quantity 100)  •	Lancets for use up to 4 times daily (total quantity 100)  •	Generic Glucometer Test strips for use up to 4 times daily (total quantity 100)  •	Alcohol swabs for use up to 4 times daily (total quantity 100)  •	Generic Glucometer (If provided by hospital, still provide scripts for lancets, test strips, and swabs)  •	Do not specify brand for testing supplies (such as contour, freestyle, one touch etc) that way the pharmacy has the freedom to pick and change according to what the insurance dictates.  For patients without insurance:   •	Provide social work with appropriate scripts so they may obtain 1 week of samples  •	Provide with glucometer. Glucometers are located at various nursing stations, the nursing office, education, and endocrine fellows office.  •	Please make appointment with Tiarra Gamez NP or Kalpana Toribio RN and JOE Padilla at the [address] [place] [address] endocrinology clinic. They can see patients without insurance, provide appropriate samples, and assist in getting insurance coverage.     PREFERRED PHARMACY:  [facility] (located on 1st floor next to admitting)  P: [phone]  Hours: M – F 8AM – 8PM, Sat 8AM – 4PM, Sun—closed  If not using [facility], please follow up with chosen pharmacy to ensure insulin prescribed is covered.

## 2019-11-06 NOTE — PROGRESS NOTE ADULT - PROBLEM SELECTOR PLAN 2
See plan above  Home regimen per PCP in [place]: 30 Lantus qHs / 10 Lispro premeal  -f/u endocrine recs

## 2019-11-06 NOTE — PROGRESS NOTE ADULT - PROBLEM SELECTOR PLAN 6
1) PCP Contacted on Admission: (Y/N) --> Name & Phone #: none; will establish PCP  2) Date of Contact with PCP:  3) PCP Contacted at Discharge: (Y/N, N/A)  4) Summary of Handoff Given to PCP:   5) Post-Discharge Appointment Date and Location:

## 2019-11-06 NOTE — PROGRESS NOTE ADULT - SUBJECTIVE AND OBJECTIVE BOX
OVERNIGHT EVENTS: No acute events overnight.  SUBJECTIVE: Patient seen and examined at the bedside. Denies dizziness, diaphoresis, fatigue, nausea, vomiting, abdominal pain. Endorses intermittent productive cough. 12-point ROS reviewed and is otherwise negative.    Vital Signs Last 12 Hrs  T(F): 98.1 (19 @ 05:50), Max: 98.3 (19 @ 02:00)  HR: 81 (19 @ 05:50) (81 - 95)  BP: 99/55 (19 @ 05:50) (99/55 - 110/60)  RR: 18 (19 @ 05:50) (16 - 18)  SpO2: 97% (19 @ 05:50) (97% - 99%)    PHYSICAL EXAM:  General: NAD, resting comfortably in bed  HEENT: NCAT, PERRL, no nasal discharge, no oropharyngeal erythema  Neck: no JVD  Respiratory: CTA b/l, no WRR  Cardiovascular: RRR, normal s1/s2, no MRG  Vascular: 2+ radial and DP pulses  Abdomen: soft, NTND, +BS x4 quadrants  Extremities: WWP, no clubbing, no cyanosis, no edema  Skin: no gross skin abnormalities or rashes noted  Neuro: AAOx3, no focal neurological deficits    LABS:                      12.5   10.19 )-----------( 235      ( 2019 06:34 )             39.6     144  |  112<H>  |  17  ----------------------------<  159<H>  4.0   |  22  |  0.58    Ca    8.1<L>      2019 06:34  Mg     1.9     11-    TPro  5.5<L>  /  Alb  3.5  /  TBili  0.3  /  DBili  x   /  AST  14  /  ALT  11  /  AlkPhos  67  -    Lactate: 1.5    RVP: (+)enterovirus    Urinalysis Basic - ( 2019 20:50 )  Color: Yellow / Appearance: Clear / S.020 / pH: x  Gluc: x / Ketone: 40 mg/dL  / Bili: Negative / Urobili: 0.2 E.U./dL   Blood: x / Protein: NEGATIVE mg/dL / Nitrite: NEGATIVE   Leuk Esterase: NEGATIVE / RBC: x / WBC x   Sq Epi: x / Non Sq Epi: x / Bacteria: x      RADIOLOGY & ADDITIONAL TESTS: Reviewed    MEDICATIONS  (STANDING):  dextrose 5%. 1000 milliLiter(s) (50 mL/Hr) IV Continuous <Continuous>  dextrose 50% Injectable 12.5 Gram(s) IV Push once  dextrose 50% Injectable 25 Gram(s) IV Push once  dextrose 50% Injectable 25 Gram(s) IV Push once  insulin glargine Injectable (LANTUS) 30 Unit(s) SubCutaneous at bedtime  insulin lispro (HumaLOG) corrective regimen sliding scale   SubCutaneous Before meals and at bedtime  insulin lispro Injectable (HumaLOG) 8 Unit(s) SubCutaneous three times a day before meals  sodium chloride 0.9%. 1000 milliLiter(s) (100 mL/Hr) IV Continuous <Continuous>    MEDICATIONS  (PRN):  albuterol/ipratropium for Nebulization 3 milliLiter(s) Nebulizer every 6 hours PRN Shortness of Breath and/or Wheezing  dextrose 40% Gel 15 Gram(s) Oral once PRN Blood Glucose LESS THAN 70 milliGRAM(s)/deciliter  glucagon  Injectable 1 milliGRAM(s) IntraMuscular once PRN Glucose LESS THAN 70 milligrams/deciliter

## 2019-11-08 DIAGNOSIS — J06.9 ACUTE UPPER RESPIRATORY INFECTION, UNSPECIFIED: ICD-10-CM

## 2019-11-08 DIAGNOSIS — E87.2 ACIDOSIS: ICD-10-CM

## 2019-11-08 DIAGNOSIS — B97.19 OTHER ENTEROVIRUS AS THE CAUSE OF DISEASES CLASSIFIED ELSEWHERE: ICD-10-CM

## 2019-11-08 DIAGNOSIS — Z79.4 LONG TERM (CURRENT) USE OF INSULIN: ICD-10-CM

## 2019-11-08 DIAGNOSIS — R73.9 HYPERGLYCEMIA, UNSPECIFIED: ICD-10-CM

## 2019-11-08 DIAGNOSIS — E10.21 TYPE 1 DIABETES MELLITUS WITH DIABETIC NEPHROPATHY: ICD-10-CM

## 2019-11-08 DIAGNOSIS — E10.65 TYPE 1 DIABETES MELLITUS WITH HYPERGLYCEMIA: ICD-10-CM

## 2019-11-08 DIAGNOSIS — F17.210 NICOTINE DEPENDENCE, CIGARETTES, UNCOMPLICATED: ICD-10-CM

## 2019-11-08 LAB
-  AMPICILLIN: SIGNIFICANT CHANGE UP
-  CLINDAMYCIN: SIGNIFICANT CHANGE UP
-  ERYTHROMYCIN: SIGNIFICANT CHANGE UP
-  LEVOFLOXACIN: SIGNIFICANT CHANGE UP
-  PENICILLIN: SIGNIFICANT CHANGE UP
-  VANCOMYCIN: SIGNIFICANT CHANGE UP
CULTURE RESULTS: SIGNIFICANT CHANGE UP
METHOD TYPE: SIGNIFICANT CHANGE UP
METHOD TYPE: SIGNIFICANT CHANGE UP
ORGANISM # SPEC MICROSCOPIC CNT: SIGNIFICANT CHANGE UP
SPECIMEN SOURCE: SIGNIFICANT CHANGE UP

## 2019-11-11 LAB
CULTURE RESULTS: SIGNIFICANT CHANGE UP
CULTURE RESULTS: SIGNIFICANT CHANGE UP
SPECIMEN SOURCE: SIGNIFICANT CHANGE UP
SPECIMEN SOURCE: SIGNIFICANT CHANGE UP

## 2019-11-15 PROBLEM — Z00.00 ENCOUNTER FOR PREVENTIVE HEALTH EXAMINATION: Status: ACTIVE | Noted: 2019-11-15

## 2019-11-26 ENCOUNTER — APPOINTMENT (OUTPATIENT)
Dept: ENDOCRINOLOGY | Facility: CLINIC | Age: 29
End: 2019-11-26

## 2019-11-26 ENCOUNTER — RESULT CHARGE (OUTPATIENT)
Age: 29
End: 2019-11-26

## 2019-11-26 ENCOUNTER — APPOINTMENT (OUTPATIENT)
Dept: ENDOCRINOLOGY | Facility: CLINIC | Age: 29
End: 2019-11-26
Payer: SELF-PAY

## 2019-11-26 VITALS
DIASTOLIC BLOOD PRESSURE: 77 MMHG | WEIGHT: 164 LBS | HEIGHT: 69 IN | HEART RATE: 76 BPM | SYSTOLIC BLOOD PRESSURE: 113 MMHG | BODY MASS INDEX: 24.29 KG/M2

## 2019-11-26 DIAGNOSIS — E10.65 TYPE 1 DIABETES MELLITUS WITH HYPERGLYCEMIA: ICD-10-CM

## 2019-11-26 LAB
GLUCOSE BLDC GLUCOMTR-MCNC: 539
HBA1C MFR BLD HPLC: 10.5

## 2019-11-26 PROCEDURE — 97802 MEDICAL NUTRITION INDIV IN: CPT

## 2019-12-09 ENCOUNTER — APPOINTMENT (OUTPATIENT)
Dept: ENDOCRINOLOGY | Facility: CLINIC | Age: 29
End: 2019-12-09

## 2020-03-02 PROBLEM — E10.9 TYPE 1 DIABETES MELLITUS WITHOUT COMPLICATIONS: Chronic | Status: ACTIVE | Noted: 2019-11-05

## 2023-10-24 NOTE — H&P ADULT - PROBLEM SELECTOR PLAN 6
Electrodesiccation Text: The wound bed was treated with electrodesiccation after the biopsy was performed. 1) PCP Contacted on Admission: (Y/N) --> Name & Phone #:  2) Date of Contact with PCP:  3) PCP Contacted at Discharge: (Y/N, N/A)  4) Summary of Handoff Given to PCP:   5) Post-Discharge Appointment Date and Location:

## 2024-01-29 NOTE — ED ADULT TRIAGE NOTE - ESI TRIAGE ACUITY LEVEL, MLM
[FreeTextEntry1] : 66yo 3 [Medication Management] : medication management

## 2025-05-20 NOTE — ED PROVIDER NOTE - NS ED MD SHIFT CHANGE PENDING ITEMS
Advance Care Planning   Healthcare Decision Maker:    Primary Decision Maker: Sky De La Paz([place]) - [facility] - [phone]     physician/consult arrival
